# Patient Record
Sex: FEMALE | Race: WHITE | URBAN - METROPOLITAN AREA
[De-identification: names, ages, dates, MRNs, and addresses within clinical notes are randomized per-mention and may not be internally consistent; named-entity substitution may affect disease eponyms.]

---

## 2019-04-17 ENCOUNTER — MOHS SURGERY-NO REPAIR (OUTPATIENT)
Dept: URBAN - METROPOLITAN AREA CLINIC 11 | Facility: CLINIC | Age: 64
Setting detail: DERMATOLOGY
End: 2019-04-17

## 2019-04-17 DIAGNOSIS — D48.5 NEOPLASM OF UNCERTAIN BEHAVIOR OF SKIN: ICD-10-CM

## 2019-04-17 PROCEDURE — 17311 MOHS 1 STAGE H/N/HF/G: CPT

## 2019-04-17 PROCEDURE — 17312 MOHS ADDL STAGE: CPT

## 2022-11-29 ENCOUNTER — APPOINTMENT (OUTPATIENT)
Dept: URBAN - METROPOLITAN AREA SURGERY 20 | Age: 67
Setting detail: DERMATOLOGY
End: 2022-11-29

## 2022-11-29 VITALS — HEART RATE: 79 BPM | TEMPERATURE: 98.1 F | DIASTOLIC BLOOD PRESSURE: 92 MMHG | SYSTOLIC BLOOD PRESSURE: 154 MMHG

## 2022-11-29 PROBLEM — C44.319 BASAL CELL CARCINOMA OF SKIN OF OTHER PARTS OF FACE: Status: ACTIVE | Noted: 2022-11-29

## 2022-11-29 PROBLEM — C44.329 SQUAMOUS CELL CARCINOMA OF SKIN OF OTHER PARTS OF FACE: Status: ACTIVE | Noted: 2022-11-29

## 2022-11-29 PROCEDURE — 17311 MOHS 1 STAGE H/N/HF/G: CPT

## 2022-11-29 PROCEDURE — OTHER MOHS SURGERY: OTHER

## 2022-11-29 PROCEDURE — 17311 MOHS 1 STAGE H/N/HF/G: CPT | Mod: 76

## 2022-11-29 PROCEDURE — OTHER MIPS QUALITY: OTHER

## 2022-11-29 NOTE — HPI: SKIN LESION (SQUAMOUS CELL CARCINOMA)
How Severe Is Your Skin Cancer?: moderate
Is This A New Presentation, Or A Follow-Up?: Squamous Cell Carcinoma
Location From Outside Provider (Will Override Previously Chosen Location): Left infraorbital
When Was Squamous Cell Carcinoma Biopsied? (Optional): 9/20/2022

## 2022-11-29 NOTE — PROCEDURE: MOHS SURGERY
Body Location Override (Optional - Billing Will Still Be Based On Selected Body Map Location If Applicable): left nasal facial junction

## 2022-11-29 NOTE — PROCEDURE: MOHS SURGERY
Body Location Override (Optional - Billing Will Still Be Based On Selected Body Map Location If Applicable): left infraorbital

## 2022-11-29 NOTE — HPI: SKIN LESION (BASAL CELL CARCINOMA)
How Severe Is Your Skin Cancer?: mild
Is This A New Presentation, Or A Follow-Up?: Basal Cell Carcinoma
Location From Outside Provider (Will Override Previously Chosen Location): Left nasal facial junction
When Was Basal Cell Biopsied? (Optional): 9/20/2022
